# Patient Record
Sex: FEMALE | Race: WHITE | Employment: FULL TIME | URBAN - METROPOLITAN AREA
[De-identification: names, ages, dates, MRNs, and addresses within clinical notes are randomized per-mention and may not be internally consistent; named-entity substitution may affect disease eponyms.]

---

## 2024-05-01 ENCOUNTER — OFFICE VISIT (OUTPATIENT)
Dept: OBGYN CLINIC | Facility: CLINIC | Age: 49
End: 2024-05-01
Payer: COMMERCIAL

## 2024-05-01 VITALS
WEIGHT: 125.2 LBS | BODY MASS INDEX: 22.18 KG/M2 | SYSTOLIC BLOOD PRESSURE: 120 MMHG | DIASTOLIC BLOOD PRESSURE: 76 MMHG | HEIGHT: 63 IN

## 2024-05-01 DIAGNOSIS — Z80.41 FAMILY HISTORY OF OVARIAN CANCER: ICD-10-CM

## 2024-05-01 DIAGNOSIS — Z80.43 FAMILY HISTORY OF TESTICULAR CANCER: ICD-10-CM

## 2024-05-01 DIAGNOSIS — Z01.419 ENCOUNTER FOR ANNUAL ROUTINE GYNECOLOGICAL EXAMINATION: Primary | ICD-10-CM

## 2024-05-01 DIAGNOSIS — Z12.4 CERVICAL CANCER SCREENING: ICD-10-CM

## 2024-05-01 DIAGNOSIS — Z12.31 OTHER SCREENING MAMMOGRAM: ICD-10-CM

## 2024-05-01 PROCEDURE — S0610 ANNUAL GYNECOLOGICAL EXAMINA: HCPCS | Performed by: OBSTETRICS & GYNECOLOGY

## 2024-05-01 RX ORDER — FLUTICASONE PROPIONATE AND SALMETEROL XINAFOATE 115; 21 UG/1; UG/1
AEROSOL, METERED RESPIRATORY (INHALATION)
COMMUNITY
Start: 2024-04-17

## 2024-05-01 NOTE — PROGRESS NOTES
Annual Wellness Visit  [facility] - [address], [address], [place] [address]    ASSESSMENT/PLAN: Bella Rizo is a 48 y.o.  who presents for annual gynecologic exam.    Encounter for routine gynecologic examination  - Routine well woman exam completed today.  - Cervical Cancer Screening: Current ASCCP Guidelines reviewed. Last Pap: 2017 normal. Past abnormal pap: h/o HR HPV.  Next Pap Due: today.  - STI screening offered including HIV testing: offered, pt declined  - Contraceptive counseling discussed.  Current contraception: natural family planning (NFP),   - Breast Cancer Screening: Last Mammogram not done  - Colorectal cancer screening: not done, pt has Cologuard from PCP.  - The following were reviewed in today's visit: mammography screening ordered, family planning choices, exercise, and healthy diet    Additional problems addressed during this visit:  1. Encounter for annual routine gynecological examination    2. Other screening mammogram  -     Mammo screening bilateral w 3d & cad; Future    3. Family history of ovarian cancer  Assessment & Plan:  Patient's mother with history of ovarian cancer.  No genetic testing done that patient is aware of.  Discussed there is no screening for ovarian cancer at any age and screening with ultrasound and/or CA-125 testing in patients with a family history of ovarian cancer.    Reviewed options for risk reduction include OCPs, which have been shown to decrease risk even after stopping treatment, bilateral salpingectomy for removal of fallopian tubes once childbearing is complete (studies support certain ovarian cancer may originate in fallopian tubes), and bilateral oopherectomy after menopause.    At this time she is not interested in any risk reducing interventions but will keep them in mind for the future.  Discussed that given no screening but assume an increase risk due to family history, we have very low threshold for pelvic  ultrasound if symptoms of pain, bloating or discomfort develop.  She can contact me for ultrasound order at anytime.  She expresses understanding.  Order provided for genetic counseling and testing if appropriate.  Discussed if does carry genetic mutation would recommend prophylactic BSO.    Orders:  -     Ambulatory Referral to Genetics; Future    4. Family history of testicular cancer  -     Ambulatory Referral to Genetics; Future    5. Cervical cancer screening  -     IGP, Aptima HPV, Rfx 16/18,45        Next visit: 1 year Wellness      CC:  Annual Gynecologic Examination    HPI: Bella Rizo is a 48 y.o.  who presents for annual gynecologic examination.  She denies any breast, urinary or pelvic issues at today's visit.  Last seen in our office in .  Denies any gyn care or pap smears done elsewhere.    Periods monthly - no issues.  Natural family planning for contraception.  No new partners in last 4 years.        Gyn History  Patient's last menstrual period was 2024 (exact date).     Last Pap: 2017 was normal    She  reports being sexually active and has had partner(s) who are male.       OB History      Past Medical History:  No date: Asthma     No past surgical history on file.     Family History   Problem Relation Age of Onset    Hypertension Mother     Diabetes type II Mother     Ovarian cancer Mother         70s, recurrence 7 years later    Skin cancer Mother     Hypertension Father     Diabetes type II Father     Scleritis Father     Skin cancer Father     Testicular cancer Brother     Breast cancer Maternal Grandmother         Social History     Tobacco Use    Smoking status: Former     Types: Cigarettes    Smokeless tobacco: Never   Vaping Use    Vaping status: Never Used   Substance Use Topics    Alcohol use: Not Currently    Drug use: Not Currently          Current Outpatient Medications:     Advair -21 MCG/ACT inhaler, , Disp: , Rfl:     She has No Known  "Allergies..    ROS negative except as noted in HPI    Objective:  /76 (BP Location: Left arm, Patient Position: Sitting, Cuff Size: Standard)   Ht 5' 3.39\" (1.61 m)   Wt 56.8 kg (125 lb 3.2 oz)   LMP 04/23/2024 (Exact Date)   BMI 21.91 kg/m²      Physical Exam  Constitutional:       Appearance: Normal appearance.   Chest:   Breasts:     Right: Normal. No mass or tenderness.      Left: Normal. No mass or tenderness.   Abdominal:      Palpations: Abdomen is soft.      Tenderness: There is no abdominal tenderness.   Genitourinary:     General: Normal vulva.      Vagina: No bleeding or lesions.      Cervix: Normal.      Uterus: Normal. Not tender.       Adnexa:         Right: No mass or tenderness.          Left: No mass or tenderness.        Rectum: No external hemorrhoid.   Musculoskeletal:         General: Normal range of motion.   Lymphadenopathy:      Upper Body:      Right upper body: No axillary adenopathy.      Left upper body: No axillary adenopathy.   Neurological:      Mental Status: She is alert and oriented to person, place, and time.   Psychiatric:         Mood and Affect: Mood normal.         Behavior: Behavior normal.         "

## 2024-05-01 NOTE — ASSESSMENT & PLAN NOTE
Patient's mother with history of ovarian cancer.  No genetic testing done that patient is aware of.  Discussed there is no screening for ovarian cancer at any age and screening with ultrasound and/or CA-125 testing in patients with a family history of ovarian cancer.    Reviewed options for risk reduction include OCPs, which have been shown to decrease risk even after stopping treatment, bilateral salpingectomy for removal of fallopian tubes once childbearing is complete (studies support certain ovarian cancer may originate in fallopian tubes), and bilateral oopherectomy after menopause.    At this time she is not interested in any risk reducing interventions but will keep them in mind for the future.  Discussed that given no screening but assume an increase risk due to family history, we have very low threshold for pelvic ultrasound if symptoms of pain, bloating or discomfort develop.  She can contact me for ultrasound order at anytime.  She expresses understanding.  Order provided for genetic counseling and testing if appropriate.  Discussed if does carry genetic mutation would recommend prophylactic BSO.

## 2024-05-02 ENCOUNTER — TELEPHONE (OUTPATIENT)
Dept: HEMATOLOGY ONCOLOGY | Facility: CLINIC | Age: 49
End: 2024-05-02

## 2024-05-02 NOTE — TELEPHONE ENCOUNTER
I called Bella in response to a referral that was received for patient to establish care with [facility].     Outreach was made to schedule a consultation.    I left a voicemail explaining the reason for my call and advised patient to call \[facility] [place] [facility]\"" at [phone].  The referral has been closed.

## 2024-05-06 LAB
CYTOLOGIST CVX/VAG CYTO: NORMAL
DX ICD CODE: NORMAL
HPV GENOTYPE REFLEX: NORMAL
HPV I/H RISK 4 DNA CVX QL PROBE+SIG AMP: NEGATIVE
OTHER STN SPEC: NORMAL
PATH REPORT.FINAL DX SPEC: NORMAL
SL AMB NOTE:: NORMAL
SL AMB SPECIMEN ADEQUACY: NORMAL
SL AMB TEST METHODOLOGY: NORMAL